# Patient Record
Sex: FEMALE | Race: WHITE | Employment: OTHER | URBAN - METROPOLITAN AREA
[De-identification: names, ages, dates, MRNs, and addresses within clinical notes are randomized per-mention and may not be internally consistent; named-entity substitution may affect disease eponyms.]

---

## 2017-01-20 DIAGNOSIS — I10 ESSENTIAL HYPERTENSION: Primary | Chronic | ICD-10-CM

## 2017-01-20 RX ORDER — AMLODIPINE BESYLATE 5 MG/1
5 TABLET ORAL DAILY
Qty: 30 TABLET | Refills: 5 | Status: SHIPPED | OUTPATIENT
Start: 2017-01-20 | End: 2017-07-17 | Stop reason: SDUPTHER

## 2017-02-22 LAB
ALBUMIN SERPL-MCNC: 3.7 G/DL
ALP BLD-CCNC: 83 U/L
ALT SERPL-CCNC: 12 U/L
AST SERPL-CCNC: 16 U/L
BILIRUB SERPL-MCNC: 0.4 MG/DL (ref 0.1–1.4)
BUN BLDV-MCNC: 13 MG/DL
CALCIUM SERPL-MCNC: 8.9 MG/DL
CHLORIDE BLD-SCNC: 107 MMOL/L
CHOLESTEROL, TOTAL: 136 MG/DL
CHOLESTEROL/HDL RATIO: 3.4
CO2: 28 MMOL/L
CREAT SERPL-MCNC: 0.64 MG/DL
GFR CALCULATED: >60
GLUCOSE BLD-MCNC: 89 MG/DL
HDLC SERPL-MCNC: 40 MG/DL (ref 35–70)
LDL CHOLESTEROL CALCULATED: 71 MG/DL (ref 0–160)
POTASSIUM SERPL-SCNC: 4.2 MMOL/L
SODIUM BLD-SCNC: 140 MMOL/L
TOTAL PROTEIN: 6
TRIGL SERPL-MCNC: 125 MG/DL
TSH SERPL DL<=0.05 MIU/L-ACNC: 2.09 UIU/ML
VLDLC SERPL CALC-MCNC: 25 MG/DL

## 2017-02-23 DIAGNOSIS — E78.5 HYPERLIPIDEMIA, UNSPECIFIED HYPERLIPIDEMIA TYPE: Chronic | ICD-10-CM

## 2017-02-23 DIAGNOSIS — I10 ESSENTIAL HYPERTENSION: Chronic | ICD-10-CM

## 2017-02-23 DIAGNOSIS — E03.9 HYPOTHYROIDISM, UNSPECIFIED TYPE: ICD-10-CM

## 2017-03-17 DIAGNOSIS — E87.6 HYPOKALEMIA: ICD-10-CM

## 2017-03-17 RX ORDER — POTASSIUM CHLORIDE 750 MG/1
10 TABLET, FILM COATED, EXTENDED RELEASE ORAL DAILY
Qty: 30 TABLET | Refills: 2 | Status: SHIPPED | OUTPATIENT
Start: 2017-03-17 | End: 2017-06-19 | Stop reason: SDUPTHER

## 2017-04-26 RX ORDER — SIMVASTATIN 40 MG
20 TABLET ORAL NIGHTLY
Qty: 45 TABLET | Refills: 1 | Status: SHIPPED | OUTPATIENT
Start: 2017-04-26 | End: 2017-10-19 | Stop reason: SDUPTHER

## 2017-04-28 ENCOUNTER — OFFICE VISIT (OUTPATIENT)
Dept: FAMILY MEDICINE CLINIC | Age: 82
End: 2017-04-28
Payer: COMMERCIAL

## 2017-04-28 VITALS
DIASTOLIC BLOOD PRESSURE: 68 MMHG | TEMPERATURE: 97.9 F | WEIGHT: 201.5 LBS | BODY MASS INDEX: 29.76 KG/M2 | HEART RATE: 81 BPM | SYSTOLIC BLOOD PRESSURE: 116 MMHG | RESPIRATION RATE: 15 BRPM

## 2017-04-28 DIAGNOSIS — I48.0 PAROXYSMAL A-FIB (HCC): Chronic | ICD-10-CM

## 2017-04-28 DIAGNOSIS — R56.9 SEIZURE (HCC): ICD-10-CM

## 2017-04-28 DIAGNOSIS — R41.3 MEMORY IMPAIRMENT OF GRADUAL ONSET: ICD-10-CM

## 2017-04-28 DIAGNOSIS — E03.9 HYPOTHYROIDISM, UNSPECIFIED TYPE: ICD-10-CM

## 2017-04-28 DIAGNOSIS — E78.5 HYPERLIPIDEMIA, UNSPECIFIED HYPERLIPIDEMIA TYPE: Chronic | ICD-10-CM

## 2017-04-28 DIAGNOSIS — I10 ESSENTIAL HYPERTENSION: Primary | Chronic | ICD-10-CM

## 2017-04-28 DIAGNOSIS — Z23 NEED FOR PNEUMOCOCCAL VACCINATION: ICD-10-CM

## 2017-04-28 PROCEDURE — 90670 PCV13 VACCINE IM: CPT | Performed by: NURSE PRACTITIONER

## 2017-04-28 PROCEDURE — 90471 IMMUNIZATION ADMIN: CPT | Performed by: NURSE PRACTITIONER

## 2017-04-28 PROCEDURE — 99214 OFFICE O/P EST MOD 30 MIN: CPT | Performed by: NURSE PRACTITIONER

## 2017-04-28 ASSESSMENT — PATIENT HEALTH QUESTIONNAIRE - PHQ9
2. FEELING DOWN, DEPRESSED OR HOPELESS: 0
SUM OF ALL RESPONSES TO PHQ9 QUESTIONS 1 & 2: 0
SUM OF ALL RESPONSES TO PHQ QUESTIONS 1-9: 0
1. LITTLE INTEREST OR PLEASURE IN DOING THINGS: 0

## 2017-04-28 ASSESSMENT — ENCOUNTER SYMPTOMS
FACIAL SWELLING: 0
COLOR CHANGE: 0
DIARRHEA: 0
EYE ITCHING: 0
CHEST TIGHTNESS: 0
ABDOMINAL DISTENTION: 0
ORTHOPNEA: 0
CONSTIPATION: 0
NAUSEA: 0
EYE DISCHARGE: 0
BLURRED VISION: 0
SHORTNESS OF BREATH: 0

## 2017-05-17 RX ORDER — LEVOTHYROXINE SODIUM 0.05 MG/1
50 TABLET ORAL DAILY
Qty: 30 TABLET | Refills: 5 | Status: SHIPPED | OUTPATIENT
Start: 2017-05-17 | End: 2017-11-14 | Stop reason: SDUPTHER

## 2017-05-17 RX ORDER — CLOPIDOGREL BISULFATE 75 MG/1
75 TABLET ORAL DAILY
Qty: 30 TABLET | Refills: 5 | Status: SHIPPED | OUTPATIENT
Start: 2017-05-17 | End: 2017-11-14 | Stop reason: SDUPTHER

## 2017-05-26 ENCOUNTER — OFFICE VISIT (OUTPATIENT)
Dept: FAMILY MEDICINE CLINIC | Age: 82
End: 2017-05-26
Payer: COMMERCIAL

## 2017-05-26 VITALS
DIASTOLIC BLOOD PRESSURE: 80 MMHG | TEMPERATURE: 98.2 F | RESPIRATION RATE: 16 BRPM | HEART RATE: 78 BPM | SYSTOLIC BLOOD PRESSURE: 122 MMHG

## 2017-05-26 DIAGNOSIS — G89.29 CHRONIC MIDLINE LOW BACK PAIN WITH RIGHT-SIDED SCIATICA: Primary | ICD-10-CM

## 2017-05-26 DIAGNOSIS — M25.551 RIGHT HIP PAIN: ICD-10-CM

## 2017-05-26 DIAGNOSIS — M54.41 CHRONIC MIDLINE LOW BACK PAIN WITH RIGHT-SIDED SCIATICA: Primary | ICD-10-CM

## 2017-05-26 PROCEDURE — 99213 OFFICE O/P EST LOW 20 MIN: CPT | Performed by: NURSE PRACTITIONER

## 2017-05-30 DIAGNOSIS — M54.41 CHRONIC MIDLINE LOW BACK PAIN WITH RIGHT-SIDED SCIATICA: ICD-10-CM

## 2017-05-30 DIAGNOSIS — G89.29 CHRONIC MIDLINE LOW BACK PAIN WITH RIGHT-SIDED SCIATICA: ICD-10-CM

## 2017-05-30 DIAGNOSIS — M25.551 RIGHT HIP PAIN: ICD-10-CM

## 2017-05-31 DIAGNOSIS — M54.41 CHRONIC MIDLINE LOW BACK PAIN WITH RIGHT-SIDED SCIATICA: ICD-10-CM

## 2017-05-31 DIAGNOSIS — G89.29 CHRONIC MIDLINE LOW BACK PAIN WITH RIGHT-SIDED SCIATICA: ICD-10-CM

## 2017-05-31 DIAGNOSIS — M25.551 RIGHT HIP PAIN: ICD-10-CM

## 2017-06-05 ASSESSMENT — ENCOUNTER SYMPTOMS
CHEST TIGHTNESS: 0
NAUSEA: 0
ABDOMINAL DISTENTION: 0
FACIAL SWELLING: 0
SHORTNESS OF BREATH: 0
DIARRHEA: 0
EYE ITCHING: 0
BACK PAIN: 1
EYE DISCHARGE: 0
COLOR CHANGE: 0
CONSTIPATION: 0

## 2017-06-06 RX ORDER — LORATADINE 10 MG/1
10 TABLET ORAL DAILY
Qty: 30 TABLET | Refills: 5 | Status: SHIPPED | OUTPATIENT
Start: 2017-06-06 | End: 2017-12-03 | Stop reason: SDUPTHER

## 2017-07-17 DIAGNOSIS — I10 ESSENTIAL HYPERTENSION: Chronic | ICD-10-CM

## 2017-07-18 RX ORDER — AMLODIPINE BESYLATE 5 MG/1
5 TABLET ORAL DAILY
Qty: 30 TABLET | Refills: 5 | Status: SHIPPED | OUTPATIENT
Start: 2017-07-18 | End: 2018-01-12 | Stop reason: SDUPTHER

## 2017-10-12 ENCOUNTER — HOSPITAL ENCOUNTER (OUTPATIENT)
Age: 82
Setting detail: SPECIMEN
Discharge: HOME | End: 2017-10-12
Payer: COMMERCIAL

## 2017-10-12 ENCOUNTER — OFFICE VISIT (OUTPATIENT)
Dept: FAMILY MEDICINE CLINIC | Age: 82
End: 2017-10-12
Payer: COMMERCIAL

## 2017-10-12 VITALS
HEIGHT: 69 IN | OXYGEN SATURATION: 97 % | RESPIRATION RATE: 14 BRPM | HEART RATE: 76 BPM | BODY MASS INDEX: 28.88 KG/M2 | WEIGHT: 195 LBS | SYSTOLIC BLOOD PRESSURE: 130 MMHG | TEMPERATURE: 97.9 F | DIASTOLIC BLOOD PRESSURE: 78 MMHG

## 2017-10-12 DIAGNOSIS — Z85.3 HISTORY OF BREAST CANCER: ICD-10-CM

## 2017-10-12 DIAGNOSIS — N64.4 BREAST PAIN, LEFT: ICD-10-CM
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
## 2017-10-12 DIAGNOSIS — M54.50 CHRONIC BILATERAL LOW BACK PAIN WITHOUT SCIATICA: ICD-10-CM

## 2017-10-12 DIAGNOSIS — R07.89 OTHER CHEST PAIN: ICD-10-CM

## 2017-10-12 DIAGNOSIS — R44.3 HALLUCINATION: ICD-10-CM

## 2017-10-12 DIAGNOSIS — G89.29 CHRONIC BILATERAL LOW BACK PAIN WITHOUT SCIATICA: ICD-10-CM

## 2017-10-12 DIAGNOSIS — R35.0 URINARY FREQUENCY: Primary | ICD-10-CM

## 2017-10-12 LAB
ALBUMIN SERPL-MCNC: 4.1 G/DL (ref 3.5–5.2)
ALBUMIN/GLOBULIN RATIO: 1.8 (ref 1–2.5)
ALP BLD-CCNC: 88 U/L (ref 35–104)
ALT SERPL-CCNC: 14 U/L (ref 5–33)
ANION GAP SERPL CALCULATED.3IONS-SCNC: 11 MMOL/L (ref 9–17)
AST SERPL-CCNC: 21 U/L
BILIRUB SERPL-MCNC: 0.27 MG/DL (ref 0.3–1.2)
BILIRUBIN, POC: NEGATIVE
BLOOD URINE, POC: NORMAL
BUN BLDV-MCNC: 13 MG/DL (ref 8–23)
BUN/CREAT BLD: ABNORMAL (ref 9–20)
CALCIUM SERPL-MCNC: 9.1 MG/DL (ref 8.6–10.4)
CHLORIDE BLD-SCNC: 102 MMOL/L (ref 98–107)
CLARITY, POC: CLEAR
CO2: 26 MMOL/L (ref 20–31)
COLOR, POC: YELLOW
CREAT SERPL-MCNC: 0.54 MG/DL (ref 0.5–0.9)
GFR AFRICAN AMERICAN: >60 ML/MIN
GFR NON-AFRICAN AMERICAN: >60 ML/MIN
GFR SERPL CREATININE-BSD FRML MDRD: ABNORMAL ML/MIN/{1.73_M2}
GFR SERPL CREATININE-BSD FRML MDRD: ABNORMAL ML/MIN/{1.73_M2}
GLUCOSE BLD-MCNC: 92 MG/DL (ref 70–99)
GLUCOSE URINE, POC: NEGATIVE
HCT VFR BLD CALC: 46.3 % (ref 36–46)
HEMOGLOBIN: 15.3 G/DL (ref 12–16)
KETONES, POC: NEGATIVE
LEUKOCYTE EST, POC: NEGATIVE
MCH RBC QN AUTO: 29.6 PG (ref 26–34)
MCHC RBC AUTO-ENTMCNC: 33.1 G/DL (ref 31–37)
MCV RBC AUTO: 89.5 FL (ref 80–100)
NITRITE, POC: NEGATIVE
PDW BLD-RTO: 14 % (ref 12.5–15.4)
PH, POC: 6.5
PLATELET # BLD: 207 K/UL (ref 140–450)
PMV BLD AUTO: 7.9 FL (ref 6–12)
POTASSIUM SERPL-SCNC: 4.8 MMOL/L (ref 3.7–5.3)
PROTEIN, POC: NEGATIVE
RBC # BLD: 5.17 M/UL (ref 4–5.2)
SODIUM BLD-SCNC: 139 MMOL/L (ref 135–144)
SPECIFIC GRAVITY, POC: 1.02
TOTAL PROTEIN: 6.4 G/DL (ref 6.4–8.3)
UROBILINOGEN, POC: 1
WBC # BLD: 7.9 K/UL (ref 3.5–11)

## 2017-10-12 PROCEDURE — 99214 OFFICE O/P EST MOD 30 MIN: CPT | Performed by: NURSE PRACTITIONER

## 2017-10-12 PROCEDURE — 81002 URINALYSIS NONAUTO W/O SCOPE: CPT | Performed by: NURSE PRACTITIONER

## 2017-10-12 NOTE — PROGRESS NOTES
Discussed use, benefit, and side effects of prescribed medications. Barriers to medication compliance addressed. All patient questions answered. Pt voiced understanding. 5.  Patient given educational materials - see patient instructions  6. Was a self-tracking handout given in paper form or via Nitcht? No  If yes, see orders or list here. Completed Refills   Requested Prescriptions      No prescriptions requested or ordered in this encounter       All patient questions answered. Patient voiced understanding.        PHQ Scores 4/28/2017 2/16/2016   PHQ2 Score 0 2   PHQ9 Score 0 7     Interpretation of Total Score Depression Severity: 1-4 = Minimal depression, 5-9 = Mild depression, 10-14 = Moderate depression, 15-19 = Moderately severe depression, 20-27 = Severe depression  Normal

## 2017-10-12 NOTE — PATIENT INSTRUCTIONS
Patient Education        Frequent Urination: Care Instructions  Your Care Instructions  An urge to urinate frequently but usually passing only small amounts of urine is a common symptom of urinary problems, such as urinary tract infections. The bladder may become inflamed. This can cause the urge to urinate. You may try to urinate more often than usual to try to soothe that urge. Frequent urination also may be caused by sexually transmitted infections (STIs) or kidney stones. Or it may happen when something irritates the tube that carries urine from the bladder to the outside of the body (urethra). It may also be a sign of diabetes. The cause may be hard to find. You may need tests. Follow-up care is a key part of your treatment and safety. Be sure to make and go to all appointments, and call your doctor if you are having problems. It's also a good idea to know your test results and keep a list of the medicines you take. How can you care for yourself at home? · Drink extra water for the next day or two. This will help make the urine less concentrated. (If you have kidney, heart, or liver disease and have to limit fluids, talk with your doctor before you increase the amount of fluids you drink.)  · Avoid drinks that are carbonated or have caffeine. They can irritate the bladder. For women:  · Urinate right after you have sex. · After you go to the bathroom, wipe from front to back. · Avoid douches, bubble baths, and feminine hygiene sprays. And avoid other feminine hygiene products that have deodorants. When should you call for help? Call your doctor now or seek immediate medical care if:  · You have new symptoms, such as fever, nausea, or vomiting. · You have new or worse symptoms of a urinary problem. For example:  ¨ You have blood or pus in your urine. ¨ You have chills or body aches. ¨ It hurts to urinate. ¨ You have groin or belly pain.   ¨ You have pain in your back just below your rib cage (the flank area). Watch closely for changes in your health, and be sure to contact your doctor if you feel thirstier than usual.  Where can you learn more? Go to https://chpepiceweb.Mesh [email or web address] org and sign in to your [employer] account. Enter 924 [id number] in the [facility] box to learn more about \"Frequent Urination: Care Instructions. \"     If you do not have an account, please click on the \"Sign Up Now\" link. Current as of: May 5, 2017  Content Version: 11.3  © 8059-1868 [employer]. Care instructions adapted under license by [facility] ([facility]). If you have questions about a medical condition or this instruction, always ask your healthcare professional. [address] any warranty or liability for your use of this information. Patient Education        Musculoskeletal Chest Pain: Care Instructions  Your Care Instructions  Chest pain is not always a sign that something is wrong with your heart or that you have another serious problem. The doctor thinks your chest pain is caused by strained muscles or ligaments, inflamed chest cartilage, or another problem in your chest, rather than by your heart. You may need more tests to find the cause of your chest pain. Follow-up care is a key part of your treatment and safety. Be sure to make and go to all appointments, and call your doctor if you are having problems. Its also a good idea to know your test results and keep a list of the medicines you take. How can you care for yourself at home? · Take pain medicines exactly as directed. ¨ If the doctor gave you a prescription medicine for pain, take it as prescribed. ¨ If you are not taking a prescription pain medicine, ask your doctor if you can take an over-the-counter medicine. · Rest and protect the sore area. · Stop, change, or take a break from any activity that may be causing your pain or soreness. · Put ice or a cold pack on the sore area for 10 to 20 minutes at a time. https://chpepiceweb.Tehuti [email or web address] org and sign in to your [employer] account. Enter 7525 [id number] in the [facility] box to learn more about \"Musculoskeletal Chest Pain: Care Instructions. \"     If you do not have an account, please click on the \"Sign Up Now\" link. Current as of: March 20, 2017  Content Version: 11.3  © 1694-8187 [employer]. Care instructions adapted under license by [facility] ([facility]). If you have questions about a medical condition or this instruction, always ask your healthcare professional. [place] [address] any warranty or liability for your use of this information. Patient Education        Preventing Falls: Care Instructions  Your Care Instructions  Getting around your home safely can be a challenge if you have injuries or health problems that make it easy for you to fall. Loose rugs and furniture in walkways are among the dangers for many older people who have problems walking or who have poor eyesight. People who have conditions such as arthritis, osteoporosis, or dementia also have to be careful not to fall. You can make your home safer with a few simple measures. Follow-up care is a key part of your treatment and safety. Be sure to make and go to all appointments, and call your doctor if you are having problems. It's also a good idea to know your test results and keep a list of the medicines you take. How can you care for yourself at home? Taking care of yourself  · You may get dizzy if you do not drink enough water. To prevent dehydration, drink plenty of fluids, enough so that your urine is light yellow or clear like water. Choose water and other caffeine-free clear liquids. If you have kidney, heart, or liver disease and have to limit fluids, talk with your doctor before you increase the amount of fluids you drink. · Exercise regularly to improve your strength, muscle tone, and balance. Walk if you can.  Swimming may be a good choice if you cannot signal for help. · Wear low-heeled shoes that fit well and give your feet good support. Use footwear with nonskid soles. Check the heels and soles of your shoes for wear. Repair or replace worn heels or soles. · Do not wear socks without shoes on wood floors. · Walk on the grass when the sidewalks are slippery. If you live in an area that gets snow and ice in the winter, sprinkle salt on slippery steps and sidewalks. Preventing falls in the bath  · Install grab bars and nonskid mats inside and outside your shower or tub and near the toilet and sinks. · Use shower chairs and bath benches. · Use a hand-held shower head that will allow you to sit while showering. · Get into a tub or shower by putting the weaker leg in first. Get out of a tub or shower with your strong side first.  · Repair loose toilet seats and consider installing a raised toilet seat to make getting on and off the toilet easier. · Keep your bathroom door unlocked while you are in the shower. Where can you learn more? Go to https://FuturlinkpeZafgen.Wireless Generation. org and sign in to your [employer] account. Enter [id number] [id number] in the [facility] box to learn more about \"Preventing Falls: Care Instructions. \"     If you do not have an account, please click on the \"Sign Up Now\" link. Current as of: August 4, 2016  Content Version: 11.3  © 1622-7399 [employer]. Care instructions adapted under license by [facility] ([facility]). If you have questions about a medical condition or this instruction, always ask your healthcare professional. [place] [address] any warranty or liability for your use of this information.
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
## 2017-10-13 DIAGNOSIS — R07.89 OTHER CHEST PAIN: ICD-10-CM

## 2017-10-13 LAB
CULTURE: NORMAL
CULTURE: NORMAL
Lab: NORMAL
SPECIMEN DESCRIPTION: NORMAL
STATUS: NORMAL

## 2017-10-16 DIAGNOSIS — N64.4 BREAST PAIN: Primary | ICD-10-CM

## 2017-10-16 DIAGNOSIS — Z85.3 HISTORY OF BREAST CANCER: ICD-10-CM

## 2017-10-16 NOTE — PROGRESS NOTES
Order and demographic faxed to [facility] ED patient request, with request to schedule patient.

## 2017-10-20 RX ORDER — SIMVASTATIN 40 MG
20 TABLET ORAL NIGHTLY
Qty: 45 TABLET | Refills: 1 | Status: SHIPPED | OUTPATIENT
Start: 2017-10-20 | End: 2018-01-01 | Stop reason: SDUPTHER

## 2017-10-21 ASSESSMENT — ENCOUNTER SYMPTOMS
SHORTNESS OF BREATH: 0
NAUSEA: 0
EYE DISCHARGE: 0
CHEST TIGHTNESS: 0
FACIAL SWELLING: 0
COLOR CHANGE: 0
CONSTIPATION: 0
EYE ITCHING: 0
DIARRHEA: 0
ABDOMINAL DISTENTION: 0

## 2017-10-23 DIAGNOSIS — Z85.3 HISTORY OF BREAST CANCER: ICD-10-CM

## 2017-10-23 DIAGNOSIS — N64.4 BREAST PAIN: ICD-10-CM

## 2017-11-15 RX ORDER — LEVOTHYROXINE SODIUM 0.05 MG/1
50 TABLET ORAL DAILY
Qty: 30 TABLET | Refills: 5 | Status: SHIPPED | OUTPATIENT
Start: 2017-11-15 | End: 2018-01-01 | Stop reason: SDUPTHER

## 2017-11-15 RX ORDER — CLOPIDOGREL BISULFATE 75 MG/1
75 TABLET ORAL DAILY
Qty: 30 TABLET | Refills: 5 | Status: SHIPPED | OUTPATIENT
Start: 2017-11-15 | End: 2018-01-01 | Stop reason: SDUPTHER

## 2017-12-04 RX ORDER — LORATADINE 10 MG/1
10 TABLET ORAL DAILY
Qty: 30 TABLET | Refills: 5 | Status: SHIPPED | OUTPATIENT
Start: 2017-12-04 | End: 2018-01-01 | Stop reason: SDUPTHER

## 2017-12-04 NOTE — TELEPHONE ENCOUNTER
Last refill was 6/6/2017 #30-5  Last visit was 4/28/2017 RTO 6 months  Health Maintenance   Topic Date Due    Zostavax vaccine  08/19/1978    Flu vaccine (1) 09/01/2017    DTaP/Tdap/Td vaccine (2 - Td) 09/11/2018    Pneumococcal low/med risk  Completed             (applicable per patient's age: Cancer Screenings, Depression Screening, Fall Risk Screening, Immunizations)    LDL Calculated (mg/dL)   Date Value   02/22/2017 71     AST (U/L)   Date Value   10/12/2017 21     ALT (U/L)   Date Value   10/12/2017 14     BUN (mg/dL)   Date Value   10/12/2017 13      (goal A1C is < 7)   (goal LDL is <100) need 30-50% reduction from baseline     BP Readings from Last 3 Encounters:   10/12/17 130/78   05/26/17 122/80   04/28/17 116/68    (goal /80)      All Future Testing planned in [facility]:  Lab Frequency Next Occurrence   ARACELI DIAGNOSTIC W CAD BILATERAL Once 10/20/2017       Next Visit Date:  Future Appointments  Date Time Provider Mike Reynolds   12/20/2017 2:00 PM CAMRYN Watson [facility]            Patient Active Problem List:     Paroxysmal a-fib ([address]     HTN (hypertension)     Hyperlipidemia     Seizure ([address]     Chest pain     Memory impairment of gradual onset     Bilateral edema of lower extremity     Hypothyroidism     Chronic bilateral low back pain without sciatica

## 2017-12-06 ENCOUNTER — TELEPHONE (OUTPATIENT)
Dept: FAMILY MEDICINE CLINIC | Age: 82
End: 2017-12-06

## 2017-12-06 NOTE — TELEPHONE ENCOUNTER
Taina Pinto from [facility] contacted office to advise that patient has had 2 falls with out injury. 1st fall was 11-23-17 with no injury, 2nd fall 12-1-17.   Rescue was called to help patient get up but no injury noted  Health Maintenance   Topic Date Due    Zostavax vaccine  08/19/1978    Flu vaccine (1) 09/01/2017    DTaP/Tdap/Td vaccine (2 - Td) 09/11/2018    Pneumococcal low/med risk  Completed             (applicable per patient's age: Cancer Screenings, Depression Screening, Fall Risk Screening, Immunizations)    LDL Calculated (mg/dL)   Date Value   02/22/2017 71     AST (U/L)   Date Value   10/12/2017 21     ALT (U/L)   Date Value   10/12/2017 14     BUN (mg/dL)   Date Value   10/12/2017 13      (goal A1C is < 7)   (goal LDL is <100) need 30-50% reduction from baseline     BP Readings from Last 3 Encounters:   10/12/17 130/78   05/26/17 122/80   04/28/17 116/68    (goal /80)      All Future Testing planned in [facility]:  Lab Frequency Next Occurrence   ARACELI DIAGNOSTIC W CAD BILATERAL Once 10/20/2017       Next Visit Date:  Future Appointments  Date Time Provider Mike Reynolds   12/20/2017 2:00 PM CAMRYN [facility] [address]            Patient Active Problem List:     Paroxysmal a-fib ([facility] [address]     HTN (hypertension)     Hyperlipidemia     Seizure ([facility] [address]     Chest pain     Memory impairment of gradual onset     Bilateral edema of lower extremity     Hypothyroidism     Chronic bilateral low back pain without sciatica

## 2017-12-17 DIAGNOSIS — E87.6 HYPOKALEMIA: ICD-10-CM

## 2017-12-18 RX ORDER — POTASSIUM CHLORIDE 750 MG/1
10 TABLET, FILM COATED, EXTENDED RELEASE ORAL DAILY
Qty: 30 TABLET | Refills: 5 | Status: SHIPPED | OUTPATIENT
Start: 2017-12-18 | End: 2018-01-01 | Stop reason: SDUPTHER

## 2017-12-18 NOTE — TELEPHONE ENCOUNTER
LOV 10-12-17  RTO 12-20-17  LRF 6-19-17  Health Maintenance   Topic Date Due    Zostavax vaccine  08/19/1978    Flu vaccine (1) 09/01/2017    DTaP/Tdap/Td vaccine (2 - Td) 09/11/2018    Pneumococcal low/med risk  Completed             (applicable per patient's age: Cancer Screenings, Depression Screening, Fall Risk Screening, Immunizations)    LDL Calculated (mg/dL)   Date Value   02/22/2017 71     AST (U/L)   Date Value   10/12/2017 21     ALT (U/L)   Date Value   10/12/2017 14     BUN (mg/dL)   Date Value   10/12/2017 13      (goal A1C is < 7)   (goal LDL is <100) need 30-50% reduction from baseline     BP Readings from Last 3 Encounters:   10/12/17 130/78   05/26/17 122/80   04/28/17 116/68    (goal /80)      All Future Testing planned in [facility]:  Lab Frequency Next Occurrence   ARACELI DIAGNOSTIC W CAD BILATERAL Once 10/20/2017       Next Visit Date:  Future Appointments  Date Time Provider Mike Reynolds   12/20/2017 2:00 PM CAMRYN [facility] [address]            Patient Active Problem List:     Paroxysmal a-fib ([address]     HTN (hypertension)     Hyperlipidemia     Seizure ([facility] [address]     Chest pain     Memory impairment of gradual onset     Bilateral edema of lower extremity     Hypothyroidism     Chronic bilateral low back pain without sciatica

## 2018-01-01 ENCOUNTER — TELEPHONE (OUTPATIENT)
Dept: FAMILY MEDICINE CLINIC | Age: 83
End: 2018-01-01

## 2018-01-01 ENCOUNTER — OFFICE VISIT (OUTPATIENT)
Dept: FAMILY MEDICINE CLINIC | Age: 83
End: 2018-01-01
Payer: COMMERCIAL

## 2018-01-01 ENCOUNTER — HOSPITAL ENCOUNTER (OUTPATIENT)
Age: 83
Setting detail: SPECIMEN
Discharge: HOME | End: 2018-04-06
Payer: COMMERCIAL

## 2018-01-01 VITALS
HEART RATE: 60 BPM | SYSTOLIC BLOOD PRESSURE: 102 MMHG | DIASTOLIC BLOOD PRESSURE: 60 MMHG | RESPIRATION RATE: 16 BRPM | TEMPERATURE: 98 F

## 2018-01-01 VITALS
WEIGHT: 197 LBS | BODY MASS INDEX: 29.18 KG/M2 | HEART RATE: 64 BPM | TEMPERATURE: 99.9 F | RESPIRATION RATE: 20 BRPM | HEIGHT: 69 IN | DIASTOLIC BLOOD PRESSURE: 54 MMHG | SYSTOLIC BLOOD PRESSURE: 110 MMHG

## 2018-01-01 VITALS
HEART RATE: 68 BPM | TEMPERATURE: 97.5 F | WEIGHT: 202.82 LBS | BODY MASS INDEX: 30.04 KG/M2 | HEIGHT: 69 IN | RESPIRATION RATE: 20 BRPM

## 2018-01-01 VITALS
RESPIRATION RATE: 20 BRPM | DIASTOLIC BLOOD PRESSURE: 60 MMHG | OXYGEN SATURATION: 97 % | HEART RATE: 68 BPM | SYSTOLIC BLOOD PRESSURE: 124 MMHG | TEMPERATURE: 98 F

## 2018-01-01 DIAGNOSIS — N30.01 ACUTE CYSTITIS WITH HEMATURIA: Primary | ICD-10-CM

## 2018-01-01 DIAGNOSIS — E78.5 HYPERLIPIDEMIA, UNSPECIFIED HYPERLIPIDEMIA TYPE: Chronic | ICD-10-CM

## 2018-01-01 DIAGNOSIS — M79.89 LEG SWELLING: ICD-10-CM

## 2018-01-01 DIAGNOSIS — T07.XXXA LACERATION OF MULTIPLE SITES OF SKIN: ICD-10-CM

## 2018-01-01 DIAGNOSIS — E03.9 HYPOTHYROIDISM, UNSPECIFIED TYPE: ICD-10-CM

## 2018-01-01 DIAGNOSIS — R56.9 SEIZURE (HCC): ICD-10-CM

## 2018-01-01 DIAGNOSIS — R60.0 BILATERAL EDEMA OF LOWER EXTREMITY: ICD-10-CM

## 2018-01-01 DIAGNOSIS — S92.354A CLOSED NONDISPLACED FRACTURE OF FIFTH METATARSAL BONE OF RIGHT FOOT, INITIAL ENCOUNTER: Primary | ICD-10-CM

## 2018-01-01 DIAGNOSIS — R05.9 COUGH: Primary | ICD-10-CM

## 2018-01-01 DIAGNOSIS — N39.0 URINARY TRACT INFECTION WITHOUT HEMATURIA, SITE UNSPECIFIED: Primary | ICD-10-CM

## 2018-01-01 DIAGNOSIS — I48.0 PAROXYSMAL A-FIB (HCC): Chronic | ICD-10-CM

## 2018-01-01 DIAGNOSIS — N39.0 FREQUENT UTI: ICD-10-CM

## 2018-01-01 DIAGNOSIS — I10 ESSENTIAL HYPERTENSION: Chronic | ICD-10-CM
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
## 2018-01-01 DIAGNOSIS — S99.921A INJURY OF RIGHT FOOT, INITIAL ENCOUNTER: ICD-10-CM

## 2018-01-01 DIAGNOSIS — I48.0 PAROXYSMAL A-FIB (HCC): Primary | Chronic | ICD-10-CM

## 2018-01-01 DIAGNOSIS — S91.311A FOOT LACERATION, RIGHT, INITIAL ENCOUNTER: ICD-10-CM

## 2018-01-01 DIAGNOSIS — L02.619 CELLULITIS AND ABSCESS OF FOOT: ICD-10-CM

## 2018-01-01 DIAGNOSIS — R06.02 SHORTNESS OF BREATH: Primary | ICD-10-CM

## 2018-01-01 DIAGNOSIS — E78.5 HYPERLIPIDEMIA, UNSPECIFIED HYPERLIPIDEMIA TYPE: Primary | Chronic | ICD-10-CM

## 2018-01-01 DIAGNOSIS — E87.6 HYPOKALEMIA: ICD-10-CM

## 2018-01-01 DIAGNOSIS — L03.119 CELLULITIS AND ABSCESS OF FOOT: ICD-10-CM

## 2018-01-01 LAB
ALBUMIN SERPL-MCNC: 3.4 G/DL (ref 3.5–5.2)
ALBUMIN/GLOBULIN RATIO: 1.8 (ref 1–2.5)
ALP BLD-CCNC: 71 U/L (ref 35–104)
ALT SERPL-CCNC: 17 U/L (ref 5–33)
ANION GAP SERPL CALCULATED.3IONS-SCNC: 13 MMOL/L (ref 9–17)
AST SERPL-CCNC: 14 U/L
BILIRUB SERPL-MCNC: 0.5 MG/DL (ref 0.3–1.2)
BUN BLDV-MCNC: 8 MG/DL (ref 8–23)
BUN/CREAT BLD: ABNORMAL (ref 9–20)
CALCIUM SERPL-MCNC: 8 MG/DL (ref 8.6–10.4)
CHLORIDE BLD-SCNC: 97 MMOL/L (ref 98–107)
CHOLESTEROL, FASTING: 148 MG/DL
CHOLESTEROL/HDL RATIO: 3.1
CO2: 26 MMOL/L (ref 20–31)
CREAT SERPL-MCNC: 0.54 MG/DL (ref 0.5–0.9)
GFR AFRICAN AMERICAN: >60 ML/MIN
GFR NON-AFRICAN AMERICAN: >60 ML/MIN
GFR SERPL CREATININE-BSD FRML MDRD: ABNORMAL ML/MIN/{1.73_M2}
GFR SERPL CREATININE-BSD FRML MDRD: ABNORMAL ML/MIN/{1.73_M2}
GLUCOSE FASTING: 80 MG/DL (ref 70–99)
HCT VFR BLD CALC: 41.9 % (ref 36.3–47.1)
HDLC SERPL-MCNC: 48 MG/DL
HEMOGLOBIN: 13.2 G/DL (ref 11.9–15.1)
LDL CHOLESTEROL: 82 MG/DL (ref 0–130)
MCH RBC QN AUTO: 28.9 PG (ref 25.2–33.5)
MCHC RBC AUTO-ENTMCNC: 31.5 G/DL (ref 28.4–34.8)
MCV RBC AUTO: 91.7 FL (ref 82.6–102.9)
NRBC AUTOMATED: 0 PER 100 WBC
PDW BLD-RTO: 13.7 % (ref 11.8–14.4)
PLATELET # BLD: 168 K/UL (ref 138–453)
PMV BLD AUTO: 9.3 FL (ref 8.1–13.5)
POTASSIUM SERPL-SCNC: 4.2 MMOL/L (ref 3.7–5.3)
RBC # BLD: 4.57 M/UL (ref 3.95–5.11)
SODIUM BLD-SCNC: 136 MMOL/L (ref 135–144)
TOTAL PROTEIN: 5.3 G/DL (ref 6.4–8.3)
TRIGLYCERIDE, FASTING: 90 MG/DL
TSH SERPL DL<=0.05 MIU/L-ACNC: 2.69 MIU/L (ref 0.3–5)
VLDLC SERPL CALC-MCNC: NORMAL MG/DL (ref 1–30)
WBC # BLD: 8.1 K/UL (ref 3.5–11.3)

## 2018-01-01 PROCEDURE — 99213 OFFICE O/P EST LOW 20 MIN: CPT | Performed by: NURSE PRACTITIONER

## 2018-01-01 PROCEDURE — 99214 OFFICE O/P EST MOD 30 MIN: CPT | Performed by: NURSE PRACTITIONER

## 2018-01-01 PROCEDURE — 1111F DSCHRG MED/CURRENT MED MERGE: CPT | Performed by: NURSE PRACTITIONER

## 2018-01-01 PROCEDURE — 99495 TRANSJ CARE MGMT MOD F2F 14D: CPT | Performed by: NURSE PRACTITIONER

## 2018-01-01 RX ORDER — AMLODIPINE BESYLATE 10 MG/1
10 TABLET ORAL DAILY
Qty: 30 TABLET | Refills: 0 | Status: SHIPPED | OUTPATIENT
Start: 2018-01-01 | End: 2018-01-01 | Stop reason: SDUPTHER

## 2018-01-01 RX ORDER — NYSTATIN 100000 [USP'U]/G
POWDER TOPICAL
Qty: 60 G | Refills: 0 | Status: SHIPPED | OUTPATIENT
Start: 2018-01-01 | End: 2018-01-01 | Stop reason: SDUPTHER

## 2018-01-01 RX ORDER — NITROFURANTOIN 25; 75 MG/1; MG/1
100 CAPSULE ORAL 2 TIMES DAILY
Qty: 14 CAPSULE | Refills: 0 | Status: SHIPPED | OUTPATIENT
Start: 2018-01-01 | End: 2018-01-01

## 2018-01-01 RX ORDER — DILTIAZEM HYDROCHLORIDE 120 MG/1
120 CAPSULE, COATED, EXTENDED RELEASE ORAL DAILY
COMMUNITY
Start: 2018-01-01 | End: 2018-01-01 | Stop reason: SDUPTHER

## 2018-01-01 RX ORDER — CLOPIDOGREL BISULFATE 75 MG/1
75 TABLET ORAL DAILY
Qty: 30 TABLET | Refills: 5 | Status: SHIPPED | OUTPATIENT
Start: 2018-01-01 | End: 2019-01-01 | Stop reason: SDUPTHER

## 2018-01-01 RX ORDER — LEVOTHYROXINE SODIUM 0.05 MG/1
50 TABLET ORAL DAILY
Qty: 30 TABLET | Refills: 5 | Status: SHIPPED | OUTPATIENT
Start: 2018-01-01 | End: 2019-11-27

## 2018-01-01 RX ORDER — SIMVASTATIN 40 MG
20 TABLET ORAL NIGHTLY
Qty: 45 TABLET | Refills: 1 | Status: SHIPPED | OUTPATIENT
Start: 2018-01-01 | End: 2018-01-01 | Stop reason: HOSPADM

## 2018-01-01 RX ORDER — POTASSIUM CHLORIDE 750 MG/1
10 TABLET, FILM COATED, EXTENDED RELEASE ORAL DAILY
Qty: 30 TABLET | Refills: 5 | Status: SHIPPED | OUTPATIENT
Start: 2018-01-01 | End: 2019-01-01 | Stop reason: SDUPTHER

## 2018-01-01 RX ORDER — SULFAMETHOXAZOLE AND TRIMETHOPRIM 800; 160 MG/1; MG/1
1 TABLET ORAL 2 TIMES DAILY
Qty: 10 TABLET | Refills: 0 | Status: SHIPPED | OUTPATIENT
Start: 2018-01-01 | End: 2018-01-01

## 2018-01-01 RX ORDER — LEVOTHYROXINE SODIUM 0.05 MG/1
50 TABLET ORAL DAILY
Qty: 30 TABLET | Refills: 5 | Status: SHIPPED | OUTPATIENT
Start: 2018-01-01 | End: 2018-01-01 | Stop reason: SDUPTHER

## 2018-01-01 RX ORDER — ACETAMINOPHEN 500 MG
500 TABLET ORAL EVERY 6 HOURS PRN
COMMUNITY

## 2018-01-01 RX ORDER — AMLODIPINE BESYLATE 10 MG/1
10 TABLET ORAL DAILY
Qty: 30 TABLET | Refills: 5 | Status: SHIPPED | OUTPATIENT
Start: 2018-01-01 | End: 2018-01-01 | Stop reason: ALTCHOICE

## 2018-01-01 RX ORDER — LEVETIRACETAM 500 MG/1
500 TABLET ORAL
Refills: 1 | COMMUNITY
Start: 2018-01-01 | End: 2018-01-01 | Stop reason: SDUPTHER

## 2018-01-01 RX ORDER — ALBUTEROL SULFATE 90 UG/1
2 AEROSOL, METERED RESPIRATORY (INHALATION) EVERY 6 HOURS PRN
Qty: 1 INHALER | Refills: 0 | Status: SHIPPED | OUTPATIENT
Start: 2018-01-01 | End: 2019-01-01

## 2018-01-01 RX ORDER — LEVETIRACETAM 500 MG/1
500 TABLET ORAL DAILY
Qty: 60 TABLET | Refills: 1
Start: 2018-01-01

## 2018-01-01 RX ORDER — ALPRAZOLAM 0.25 MG/1
0.25 TABLET ORAL NIGHTLY PRN
Status: CANCELLED | OUTPATIENT
Start: 2018-01-01 | End: 2019-05-01

## 2018-01-01 RX ORDER — NYSTATIN 100000 [USP'U]/G
POWDER TOPICAL
Qty: 45 G | Refills: 0 | Status: SHIPPED | OUTPATIENT
Start: 2018-01-01 | End: 2018-01-01 | Stop reason: SDUPTHER

## 2018-01-01 RX ORDER — NYSTATIN 100000 [USP'U]/G
POWDER TOPICAL PRN
Qty: 1 BOTTLE | Refills: 0
Start: 2018-01-01 | End: 2019-01-01 | Stop reason: ALTCHOICE

## 2018-01-01 RX ORDER — CARVEDILOL 12.5 MG/1
TABLET ORAL
Qty: 60 TABLET | Refills: 3
Start: 2018-01-01 | End: 2018-01-01 | Stop reason: SDUPTHER

## 2018-01-01 RX ORDER — SULFAMETHOXAZOLE AND TRIMETHOPRIM 800; 160 MG/1; MG/1
1 TABLET ORAL 2 TIMES DAILY
Qty: 20 TABLET | Refills: 0 | Status: SHIPPED | OUTPATIENT
Start: 2018-01-01 | End: 2018-01-01

## 2018-01-01 RX ORDER — LORATADINE 10 MG/1
10 TABLET ORAL DAILY
Qty: 30 TABLET | Refills: 5 | Status: SHIPPED | OUTPATIENT
Start: 2018-01-01 | End: 2019-01-01 | Stop reason: SDUPTHER

## 2018-01-01 RX ORDER — ATORVASTATIN CALCIUM 10 MG/1
10 TABLET, FILM COATED ORAL DAILY
Qty: 30 TABLET | Refills: 5 | Status: SHIPPED | OUTPATIENT
Start: 2018-01-01 | End: 2019-01-01 | Stop reason: SDUPTHER

## 2018-01-01 RX ORDER — CEPHALEXIN 500 MG/1
500 CAPSULE ORAL 4 TIMES DAILY
Qty: 40 CAPSULE | Refills: 0 | Status: SHIPPED | OUTPATIENT
Start: 2018-01-01 | End: 2018-01-01

## 2018-01-01 ASSESSMENT — ENCOUNTER SYMPTOMS
CONSTIPATION: 1
RHINORRHEA: 0
COLOR CHANGE: 0
FACIAL SWELLING: 0
EYE ITCHING: 0
COLOR CHANGE: 0
CHEST TIGHTNESS: 0
EYE DISCHARGE: 0
SHORTNESS OF BREATH: 0
ABDOMINAL DISTENTION: 0
CHEST TIGHTNESS: 0
COUGH: 0
SORE THROAT: 0
RHINORRHEA: 1
SHORTNESS OF BREATH: 0
EYE ITCHING: 0
NAUSEA: 0
DIARRHEA: 0
EYE DISCHARGE: 0
ABDOMINAL DISTENTION: 0
SHORTNESS OF BREATH: 0
SHORTNESS OF BREATH: 0
COUGH: 1
EYE REDNESS: 0
WHEEZING: 1
COUGH: 1
DIARRHEA: 0
FACIAL SWELLING: 0
NAUSEA: 0
CONSTIPATION: 0
NAUSEA: 0
CHEST TIGHTNESS: 0
DIARRHEA: 0
SORE THROAT: 0
CONSTIPATION: 0
VOMITING: 0
ABDOMINAL PAIN: 0
COUGH: 1
ABDOMINAL PAIN: 0
EYE DISCHARGE: 0
DIARRHEA: 0
EYE DISCHARGE: 1
NAUSEA: 0
EYE ITCHING: 0
CONSTIPATION: 0

## 2018-01-01 ASSESSMENT — PATIENT HEALTH QUESTIONNAIRE - PHQ9
1. LITTLE INTEREST OR PLEASURE IN DOING THINGS: 0
SUM OF ALL RESPONSES TO PHQ9 QUESTIONS 1 & 2: 0
2. FEELING DOWN, DEPRESSED OR HOPELESS: 0
SUM OF ALL RESPONSES TO PHQ QUESTIONS 1-9: 0

## 2018-01-02 ENCOUNTER — TELEPHONE (OUTPATIENT)
Dept: FAMILY MEDICINE CLINIC | Age: 83
End: 2018-01-02

## 2018-01-02 NOTE — TELEPHONE ENCOUNTER
Continue to monitor her for any changes. If any concerning symptoms occur or altered mental status, then she needs seen in office as soon as possible. Otherwise follow up in office as planned.

## 2018-01-12 DIAGNOSIS — I10 ESSENTIAL HYPERTENSION: Chronic | ICD-10-CM

## 2018-01-12 RX ORDER — AMLODIPINE BESYLATE 5 MG/1
5 TABLET ORAL DAILY
Qty: 30 TABLET | Refills: 3 | Status: SHIPPED | OUTPATIENT
Start: 2018-01-12 | End: 2018-01-01

## 2018-03-16 ENCOUNTER — TELEPHONE (OUTPATIENT)
Dept: FAMILY MEDICINE CLINIC | Age: 83
End: 2018-03-16

## 2018-03-21 ENCOUNTER — TELEPHONE (OUTPATIENT)
Dept: FAMILY MEDICINE CLINIC | Age: 83
End: 2018-03-21

## 2018-03-28 ENCOUNTER — OFFICE VISIT (OUTPATIENT)
Dept: FAMILY MEDICINE CLINIC | Age: 83
End: 2018-03-28
Payer: COMMERCIAL

## 2018-03-28 VITALS
HEIGHT: 69 IN | TEMPERATURE: 99 F | SYSTOLIC BLOOD PRESSURE: 98 MMHG | RESPIRATION RATE: 20 BRPM | HEART RATE: 88 BPM | WEIGHT: 197 LBS | DIASTOLIC BLOOD PRESSURE: 62 MMHG | BODY MASS INDEX: 29.18 KG/M2

## 2018-03-28 DIAGNOSIS — J18.9 PNEUMONIA OF RIGHT LUNG DUE TO INFECTIOUS ORGANISM, UNSPECIFIED PART OF LUNG: Primary | ICD-10-CM

## 2018-03-28 PROCEDURE — 99495 TRANSJ CARE MGMT MOD F2F 14D: CPT | Performed by: NURSE PRACTITIONER

## 2018-03-28 PROCEDURE — 1111F DSCHRG MED/CURRENT MED MERGE: CPT | Performed by: NURSE PRACTITIONER

## 2018-03-28 RX ORDER — PREDNISONE 10 MG/1
1 TABLET ORAL DAILY
Refills: 0 | COMMUNITY
Start: 2018-03-19 | End: 2018-01-01

## 2018-08-02 NOTE — TELEPHONE ENCOUNTER
[facility] Transitions Initial Follow Up Call    Outreach made within 2 business days of discharge: Yes    Patient: Chu Mejia Patient : 1918   MRN: [id number]  Reason for Admission: Fluid on Lungs, [address]  Discharge Date: 18       Spoke with: [facility]    Discharge department/facility: [facility] to [address] [place] [address] Interactive Patient Contact:  Was patient able to fill all prescriptions: Yes  Was patient instructed to bring all medications to the follow-up visit: Yes  Is patient taking all medications as directed in the discharge summary? Yes  Does patient understand their discharge instructions: Yes  Does patient have questions or concerns that need addressed prior to 7-14 day follow up office visit: no    Scheduled appointment with PCP within 7-14 days    Follow Up  No future appointments.     Jeremy Palomo MA

## 2018-08-03 NOTE — TELEPHONE ENCOUNTER
[place] nurse contacted office and states patient has been started on Lipitor 10 mg daily and is almost out of the Rx. Can we get a refill for this and stop the Zocor? Pharmacy verified. Patient is scheduled for 8-8-18 for a transitional visit.       Health Maintenance   Topic Date Due    Shingles Vaccine (1 of 2 - 2 Dose Series) 08[birth year]19/1968    Flu vaccine (1) 09/01/2018    DTaP/Tdap/Td vaccine (2 - Td) 09/11/2018    TSH testing  04/06/2019    Potassium monitoring  04/06/2019    Creatinine monitoring  04/06/2019    Pneumococcal low/med risk  Completed             (applicable per patient's age: Cancer Screenings, Depression Screening, Fall Risk Screening, Immunizations)    LDL Cholesterol (mg/dL)   Date Value   04/06/2018 82     LDL Calculated (mg/dL)   Date Value   02/22/2017 71     AST (U/L)   Date Value   04/06/2018 14     ALT (U/L)   Date Value   04/06/2018 17     BUN (mg/dL)   Date Value   04/06/2018 8      (goal A1C is < 7)   (goal LDL is <100) need 30-50% reduction from baseline     BP Readings from Last 3 Encounters:   05/25/18 120/80   05/01/18 124/60   04/05/18 (!) 110/54    (goal /80)      All Future Testing planned in [facility]:  Lab Frequency Next Occurrence   ARACELI DIAGNOSTIC W CAD BILATERAL Once 10/20/2017   Urinalysis Reflex to Culture Once 07/12/2018       Next Visit Date:  Future Appointments  Date Time Provider Mike Reynolds   8/8/2018 1:00 PM MATTHEW Morejon - CAMRYN Currie            Patient Active Problem List:     Paroxysmal a-fib [facility])     Essential hypertension     Hyperlipidemia     Seizure ([facility] [address]     Chest pain     Memory impairment of gradual onset     Bilateral edema of lower extremity     Hypothyroidism     Chronic bilateral low back pain without sciatica

## 2018-08-08 NOTE — PATIENT INSTRUCTIONS
Follow up with cardiology. Urine sample today. Get up to go to the bathroom every 2 hours while awake. Routine follow up October.

## 2018-08-20 NOTE — PROGRESS NOTES
2018     Ritika Brandon (:  1918) is a 80 y.o. female, here for evaluation of the following medical concerns:    BIJAL De Jesus presents to the office today with her son for a hospital follow-up was admitted to [facility] in July for shortness of breath. Diagnosed with A. fib with RVR. Treated with Cardizem. Discharged to extended care facility. Now back home. Doing much better. Denies fatigue. Taking medication as prescribed. Does have some urinary concerns. History of incontinence. Does not get up often to go to the bathroom. Advised to get up every 2 hours to urinate whether she feels like she has to or not. Patient and son verbalized understanding. Continues to have home care. Review of Systems   Constitutional: Negative for activity change, appetite change, chills and fatigue. HENT: Positive for hearing loss (hearing aide. ). Negative for congestion, ear pain and facial swelling. Eyes: Negative for discharge and itching. Respiratory: Positive for cough (improved. ). Negative for chest tightness and shortness of breath. Recently hospitalized for pneumonia. Cardiovascular: Negative for chest pain, palpitations and leg swelling (feet swell sometimes. ). Follows up with Dr. Karen Gan. Gastrointestinal: Negative for abdominal distention, constipation, diarrhea and nausea. Endocrine: Negative for polydipsia, polyphagia and polyuria. Genitourinary: Positive for frequency (uses brief. Changes frequntly, stays dry.) and urgency. Negative for decreased urine volume, difficulty urinating, flank pain, hematuria, menstrual problem and pelvic pain. Musculoskeletal: Positive for arthralgias. Negative for joint swelling, myalgias and neck pain. Uses walker. Skin: Negative for color change. Allergic/Immunologic: Positive for environmental allergies. Negative for food allergies. Neurological: Positive for seizures (none sice first one. ).  Negative for dizziness, syncope and headaches. Psychiatric/Behavioral: Positive for confusion (lives with son. ). Negative for agitation, behavioral problems and suicidal ideas. The patient is not nervous/anxious (xanax prn.). Prior to Visit Medications    Medication Sig Taking? Authorizing Provider   diltiazem (CARTIA XT) 120 MG extended release capsule Take 120 mg by mouth daily Yes Historical Provider, MD   acetaminophen (TYLENOL) 500 MG tablet Take 500 mg by mouth every 6 hours as needed for Pain 1-2 tabs prn Yes Historical Provider, MD   atorvastatin (LIPITOR) 10 MG tablet Take 1 tablet by mouth daily Yes Lorrene Kawasaki, APRN - CNP   nystatin (MYCOSTATIN) 078909 UNIT/GM powder Apply Three times a day  Patient taking differently: Apply topically as needed Apply Three times a day Yes MATTHEW Irizarry CNP   potassium chloride (KLOR-CON 10) 10 MEQ extended release tablet Take 1 tablet by mouth daily Yes Lorrene Kawasaki, APRN - CNP   loratadine (RA LORATADINE) 10 MG tablet Take 1 tablet by mouth daily Yes Lorrene Kawasaki, APRN - CNP   levothyroxine (SYNTHROID) 50 MCG tablet Take 1 tablet by mouth Daily Yes Lorrene Kawasaki, APRN - CNP   clopidogrel (PLAVIX) 75 MG tablet Take 1 tablet by mouth daily Yes Lorrene Kawasaki, APRN - CNP   levETIRAcetam (KEPPRA) 500 MG tablet Take 500 mg by mouth Yes Historical Provider, MD   albuterol sulfate HFA (PROAIR HFA) 108 (90 Base) MCG/ACT inhaler Inhale 2 puffs into the lungs every 6 hours as needed for Wheezing Yes Lorrene Kawasaki, APRN - CNP   carvedilol (COREG) 12.5 MG tablet TAKE ONE TABLET BY MOUTH ONCE A DAY  Patient taking differently: TAKE ONE TABLET BY MOUTH twice a day Yes Rekha Aquino MD   ALPRAZolam Faviola Settles) 0.25 MG tablet Take 0.25 mg by mouth nightly as needed for Sleep. Yes Historical Provider, MD   aspirin 81 MG tablet Take 81 mg by mouth daily. Yes Historical Provider, MD   Calcium Carb-Cholecalciferol (CALCIUM 600 + D PO) Take  by mouth.  Yes Historical Provider, MD   Multiple Vitamins-Minerals (MULTIVITAMIN PO) Take  by mouth. Yes Historical Provider, MD        Social History   Substance Use Topics    Smoking status: Never Smoker    Smokeless tobacco: Never Used    Alcohol use No        Vitals:    08/08/18 1307   BP: 102/60   Site: Right Arm   Position: Sitting   Cuff Size: Large Adult   Pulse: 60   Resp: 16   Temp: 98 °F (36.7 °C)   TempSrc: Tympanic   Weight: Comment: wheelchair bound   Height: Comment: wheelchair bound     Estimated body mass index is 28.27 kg/m² as calculated from the following:    Height as of 5/25/18: 5' 10\" (1.778 m). Weight as of 4/5/18: 197 lb (89.4 kg). Physical Exam   Constitutional: She appears well-developed and well-nourished. No distress. HENT:   Head: Normocephalic and atraumatic. Right Ear: Tympanic membrane, external ear and ear canal normal. Decreased hearing is noted. Left Ear: Tympanic membrane, external ear and ear canal normal. Decreased hearing is noted. Nose: Nose normal.   Mouth/Throat: Oropharynx is clear and moist and mucous membranes are normal.   Eyes: Pupils are equal, round, and reactive to light. Conjunctivae are normal. Right eye exhibits no discharge. Left eye exhibits no discharge. Cardiovascular: Normal rate and regular rhythm. No murmur heard. Pulses:       Radial pulses are 2+ on the right side, and 2+ on the left side. + 1 BLE edema   Pulmonary/Chest: Effort normal and breath sounds normal. No respiratory distress. She has no wheezes. Abdominal: Soft. Bowel sounds are normal. There is no tenderness. There is no CVA tenderness. Neurological: She is alert. She displays no atrophy. She exhibits normal muscle tone. GCS eye subscore is 4. GCS verbal subscore is 5. GCS motor subscore is 6. Wheelchair. Skin: Skin is warm, dry and intact. Psychiatric: She has a normal mood and affect. Her behavior is normal. Cognition and memory are impaired.    Nursing note and vitals

## 2018-08-24 NOTE — TELEPHONE ENCOUNTER
Jerri notified.   Message left for patient to contact office  Med list mailed with Rx in question highlighted

## 2018-09-14 NOTE — TELEPHONE ENCOUNTER
Attempted to contact patients son but no answer, no VM. Writer spoke with Wallace Halsted at [facility] and informed her. She is contacting patient on her cell to advise.

## 2018-10-02 NOTE — TELEPHONE ENCOUNTER
[place] nurse contacted office stating that the patient does have an appointment today for an ER follow up that her son is bringing her to.  [place] nurse stated that the patient does still have open wounds that she would like to know how SR would like her to dress and she was also concerned about her   Right Foot as it is swelling more and she is not sure that foot was x rayed at [address] number for [place] nurse is :[phone]

## 2018-10-03 PROBLEM — S92.354A CLOSED NONDISPLACED FRACTURE OF FIFTH RIGHT METATARSAL BONE: Status: ACTIVE | Noted: 2018-01-01

## 2018-11-27 PROBLEM — I48.19 ATRIAL FIBRILLATION, PERSISTENT (HCC): Status: ACTIVE | Noted: 2018-01-01

## 2018-11-27 NOTE — TELEPHONE ENCOUNTER
LOV:4-5-18  ROV:6 months called pt unable to reach due to continuous ringing   LRF:5-16-18  Health Maintenance   Topic Date Due    Shingles Vaccine (1 of 2 - 2 Dose Series) 08/19/1968    Flu vaccine (1) 09/01/2018    DTaP/Tdap/Td vaccine (2 - Td) 09/11/2018    TSH testing  04/06/2019    Potassium monitoring  04/06/2019    Creatinine monitoring  04/06/2019    Pneumococcal low/med risk  Completed             (applicable per patient's age: Cancer Screenings, Depression Screening, Fall Risk Screening, Immunizations)    LDL Cholesterol (mg/dL)   Date Value   04/06/2018 82     LDL Calculated (mg/dL)   Date Value   02/22/2017 71     AST (U/L)   Date Value   04/06/2018 14     ALT (U/L)   Date Value   04/06/2018 17     BUN (mg/dL)   Date Value   04/06/2018 8      (goal A1C is < 7)   (goal LDL is <100) need 30-50% reduction from baseline     BP Readings from Last 3 Encounters:   08/20/18 (!) 100/40   08/08/18 102/60   05/25/18 120/80    (goal /80)      All Future Testing planned in [facility]:  Lab Frequency Next Occurrence   ARACELI DIAGNOSTIC W CAD BILATERAL Once 10/20/2017   Urinalysis Reflex to Culture Once 07/12/2018   XR CHEST STANDARD (2 VW) Once 09/14/2018       Next Visit Date:  Future Appointments  Date Time Provider Mike Reynolds   11/27/2018 1:15 PM Nichol Esteban MD [facility] [place] [address] [facility]            Patient Active Problem List:     Paroxysmal A-fib [facility])     Essential hypertension     Hyperlipidemia     Seizure ([facility] [address]     Chest pain     Memory impairment of gradual onset     Bilateral edema of lower extremity     Hypothyroidism     Chronic bilateral low back pain without sciatica     Closed nondisplaced fracture of fifth right metatarsal bone

## 2019-01-01 ENCOUNTER — OFFICE VISIT (OUTPATIENT)
Dept: FAMILY MEDICINE CLINIC | Age: 84
End: 2019-01-01
Payer: COMMERCIAL

## 2019-01-01 ENCOUNTER — TELEPHONE (OUTPATIENT)
Dept: FAMILY MEDICINE CLINIC | Age: 84
End: 2019-01-01

## 2019-01-01 VITALS
BODY MASS INDEX: 25.66 KG/M2 | SYSTOLIC BLOOD PRESSURE: 110 MMHG | DIASTOLIC BLOOD PRESSURE: 64 MMHG | TEMPERATURE: 96.6 F | WEIGHT: 184 LBS | RESPIRATION RATE: 16 BRPM | HEART RATE: 70 BPM

## 2019-01-01 VITALS
HEIGHT: 71 IN | WEIGHT: 195 LBS | DIASTOLIC BLOOD PRESSURE: 76 MMHG | BODY MASS INDEX: 27.3 KG/M2 | HEART RATE: 76 BPM | RESPIRATION RATE: 16 BRPM | TEMPERATURE: 97.8 F | SYSTOLIC BLOOD PRESSURE: 112 MMHG

## 2019-01-01 VITALS
DIASTOLIC BLOOD PRESSURE: 80 MMHG | HEART RATE: 84 BPM | TEMPERATURE: 97.7 F | RESPIRATION RATE: 22 BRPM | SYSTOLIC BLOOD PRESSURE: 124 MMHG
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
## 2019-01-01 DIAGNOSIS — M79.641 RIGHT HAND PAIN: ICD-10-CM

## 2019-01-01 DIAGNOSIS — J90 PLEURAL EFFUSION ON RIGHT: Primary | ICD-10-CM

## 2019-01-01 DIAGNOSIS — S00.12XA CONTUSION OF LEFT EYELID, INITIAL ENCOUNTER: ICD-10-CM

## 2019-01-01 DIAGNOSIS — J90 PLEURAL EFFUSION: Primary | ICD-10-CM

## 2019-01-01 DIAGNOSIS — E87.6 HYPOKALEMIA: ICD-10-CM

## 2019-01-01 LAB
ALBUMIN SERPL-MCNC: 3.1 G/DL
ALBUMIN SERPL-MCNC: 3.4 G/DL
ALP BLD-CCNC: 118 U/L
ALP BLD-CCNC: 136 U/L
ALT SERPL-CCNC: 12 U/L
ALT SERPL-CCNC: 14 U/L
ANION GAP SERPL CALCULATED.3IONS-SCNC: 8 MMOL/L
ANION GAP SERPL CALCULATED.3IONS-SCNC: 9 MMOL/L
AST SERPL-CCNC: 17 U/L
AST SERPL-CCNC: 20 U/L
BILIRUB SERPL-MCNC: 0.4 MG/DL (ref 0.1–1.4)
BILIRUB SERPL-MCNC: 1 MG/DL (ref 0.1–1.4)
BUN BLDV-MCNC: 10 MG/DL
BUN BLDV-MCNC: 13 MG/DL
CALCIUM SERPL-MCNC: 8.6 MG/DL
CALCIUM SERPL-MCNC: 8.6 MG/DL
CHLORIDE BLD-SCNC: 100 MMOL/L
CHLORIDE BLD-SCNC: 99 MMOL/L
CO2: 28 MMOL/L
CO2: 28 MMOL/L
CREAT SERPL-MCNC: 0.49 MG/DL
CREAT SERPL-MCNC: 0.6 MG/DL
GFR CALCULATED: 116
GFR CALCULATED: 92
GLUCOSE BLD-MCNC: 101 MG/DL
GLUCOSE BLD-MCNC: 99 MG/DL
POTASSIUM SERPL-SCNC: 4 MMOL/L
POTASSIUM SERPL-SCNC: NORMAL MMOL/L
SODIUM BLD-SCNC: 137 MMOL/L
SODIUM BLD-SCNC: NORMAL MMOL/L
TOTAL PROTEIN: 6.1
TOTAL PROTEIN: 6.5

## 2019-01-01 PROCEDURE — 99495 TRANSJ CARE MGMT MOD F2F 14D: CPT | Performed by: NURSE PRACTITIONER

## 2019-01-01 PROCEDURE — 99213 OFFICE O/P EST LOW 20 MIN: CPT | Performed by: NURSE PRACTITIONER

## 2019-01-01 PROCEDURE — 1111F DSCHRG MED/CURRENT MED MERGE: CPT | Performed by: NURSE PRACTITIONER

## 2019-01-01 RX ORDER — ATORVASTATIN CALCIUM 10 MG/1
10 TABLET, FILM COATED ORAL DAILY
Qty: 30 TABLET | Refills: 5 | Status: SHIPPED | OUTPATIENT
Start: 2019-01-01 | End: 2020-01-31

## 2019-01-01 RX ORDER — LORATADINE 10 MG/1
10 TABLET ORAL DAILY
Qty: 30 TABLET | Refills: 5 | Status: SHIPPED | OUTPATIENT
Start: 2019-01-01 | End: 2020-01-08

## 2019-01-01 RX ORDER — POTASSIUM CHLORIDE 750 MG/1
10 TABLET, FILM COATED, EXTENDED RELEASE ORAL DAILY
Qty: 30 TABLET | Refills: 5 | Status: SHIPPED | OUTPATIENT
Start: 2019-01-01 | End: 2020-01-09

## 2019-01-01 RX ORDER — CLOPIDOGREL BISULFATE 75 MG/1
75 TABLET ORAL DAILY
Qty: 30 TABLET | Refills: 5 | Status: SHIPPED | OUTPATIENT
Start: 2019-01-01 | End: 2020-01-08

## 2019-01-01 RX ORDER — ATORVASTATIN CALCIUM 10 MG/1
TABLET, FILM COATED ORAL
Qty: 30 TABLET | Refills: 5 | OUTPATIENT
Start: 2019-01-01

## 2019-01-01 ASSESSMENT — ENCOUNTER SYMPTOMS
ABDOMINAL PAIN: 0
ABDOMINAL DISTENTION: 0
COUGH: 0
ABDOMINAL DISTENTION: 0
SHORTNESS OF BREATH: 0
ABDOMINAL PAIN: 0
CHEST TIGHTNESS: 0
SHORTNESS OF BREATH: 0
CHEST TIGHTNESS: 0
COUGH: 1
WHEEZING: 0
ABDOMINAL PAIN: 0
ABDOMINAL DISTENTION: 0
SHORTNESS OF BREATH: 0
COUGH: 1
CHEST TIGHTNESS: 0
WHEEZING: 0

## 2019-01-01 ASSESSMENT — PATIENT HEALTH QUESTIONNAIRE - PHQ9
1. LITTLE INTEREST OR PLEASURE IN DOING THINGS: 0
SUM OF ALL RESPONSES TO PHQ9 QUESTIONS 1 & 2: 0
2. FEELING DOWN, DEPRESSED OR HOPELESS: 0
SUM OF ALL RESPONSES TO PHQ QUESTIONS 1-9: 0
SUM OF ALL RESPONSES TO PHQ QUESTIONS 1-9: 0